# Patient Record
Sex: MALE | Race: WHITE | NOT HISPANIC OR LATINO
[De-identification: names, ages, dates, MRNs, and addresses within clinical notes are randomized per-mention and may not be internally consistent; named-entity substitution may affect disease eponyms.]

---
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
## 2022-07-06 ENCOUNTER — NON-APPOINTMENT (OUTPATIENT)
Age: 22
End: 2022-07-06

## 2023-01-16 ENCOUNTER — NON-APPOINTMENT (OUTPATIENT)
Age: 23
End: 2023-01-16

## 2023-03-28 NOTE — ED PROVIDER NOTE - CARDIAC
INTERVENTION:[id number]}  Weight Bearing Status/Restrictions: 50Yarely Mcgregor [facility] Weight Bearin}  Other Medical Equipment (for information only, NOT a DME order):  {EQUIPMENT:028199135}  Other Treatments: ***    Patient's personal belongings (please select all that are sent with patient):  {CHP DME Belongings:404110754}    RN SIGNATURE:  {Esignature:[id number]}    CASE MANAGEMENT/SOCIAL WORK SECTION    Inpatient Status Date: ***    Readmission Risk Assessment Score:  Readmission Risk              Risk of Unplanned Readmission:  0           Discharging to Facility/ Agency   Name:   Address:  Phone:  Fax:    Dialysis Facility (if applicable)   Name:  Address:  Dialysis Schedule:  Phone:  Fax:    / signature: {Esignature:[id number]}    PHYSICIAN SECTION    Prognosis: {Prognosis:5375909146}    Condition at Discharge: 50Yarely Mcgregor Patient Condition:262848317}    Rehab Potential (if transferring to Rehab): {Prognosis:5865170479}    Recommended Labs or Other Treatments After Discharge: ***    Physician Certification: I certify the above information and transfer of Eulalia Car  is necessary for the continuing treatment of the diagnosis listed and that he requires {Admit to Appropriate Level of Care:28218} for {GREATER/LESS:266910949} 30 days.      Update Admission H&P: {CHP DME Changes in [facility]:996247333}    PHYSICIAN SIGNATURE:  {Esignature:[id number]}
Regular rate and rhythm, Heart sounds S1 S2 present, no murmurs, rubs or gallops

## 2023-09-23 ENCOUNTER — NON-APPOINTMENT (OUTPATIENT)
Age: 23
End: 2023-09-23

## 2024-03-19 ENCOUNTER — NON-APPOINTMENT (OUTPATIENT)
Age: 24
End: 2024-03-19

## 2024-05-19 ENCOUNTER — NON-APPOINTMENT (OUTPATIENT)
Age: 24
End: 2024-05-19

## 2024-05-22 ENCOUNTER — NON-APPOINTMENT (OUTPATIENT)
Age: 24
End: 2024-05-22

## 2024-07-29 NOTE — ED PROVIDER NOTE - PATIENT PORTAL LINK FT
Four or more times a week You can access the FollowMyHealth Patient Portal offered by [facility] by registering at the following website: [email or web address]. By joining [facility]’s FollowMyHealth portal, you will also be able to view your health information using other applications (apps) compatible with our system.

## 2024-07-31 NOTE — ED PROVIDER NOTE - TEMPLATE
Ambulatory Visit  Name: Aman Vasquez      : 1946      MRN: [id number]  Encounter Provider: NADIRA Stover  Encounter Date: 2024   [facility]    Assessment & Plan   1. Thrombocytopenia (HCC)  Assessment & Plan:  - Will repeat CBC.   Orders:  -     CBC and differential; Future  2. Asbestos exposure  Assessment & Plan:  - Hx of asbestos exposure during  service. [facility].  - Will continue to monitor.   3. Anemia, unspecified type  Assessment & Plan:  - CBC and iron panel normal.   - Will continue to monitor.    4. Prostate cancer screening  -     PSA, Total Screen; Future       History of Present Illness     Patient presents to office today for routine follow up. He is not on any prescription medications. He had his blood work done in May which was all stable except for slightly low platelets. Denies any history of thrombocytopenia. Denies any bleeding. He complained of chronic cough last visit due to asbestos exposure in the . [facility]. He has no significant concerns or complaints today.             Review of Systems   Constitutional:  Negative for fatigue and fever.   HENT:  Negative for trouble swallowing.    Eyes:  Negative for visual disturbance.   Respiratory:  Negative for cough and shortness of breath.    Cardiovascular:  Negative for chest pain and palpitations.   Gastrointestinal:  Negative for abdominal pain and blood in stool.   Endocrine: Negative for cold intolerance and heat intolerance.   Genitourinary:  Negative for difficulty urinating and dysuria.   Musculoskeletal:  Negative for gait problem.   Skin:  Negative for rash.   Neurological:  Negative for dizziness, syncope and headaches.   Hematological:  Negative for adenopathy.   Psychiatric/Behavioral:  Negative for behavioral problems.      Past Medical History:   Diagnosis Date   • Hemorrhoids      History  "reviewed. No pertinent surgical history.  Family History   Problem Relation Age of Onset   • Cancer Mother    • Cancer Father    • Cancer Daughter      Social History     Tobacco Use   • Smoking status: Never   • Smokeless tobacco: Never   Vaping Use   • Vaping status: Never Used   Substance and Sexual Activity   • Alcohol use: Yes   • Drug use: Never   • Sexual activity: Not on file     No current outpatient medications on file prior to visit.     No Known Allergies  Immunization History   Administered Date(s) Administered   • COVID-19 PFIZER VACCINE 0.3 ML IM 03/16/2021, 04/15/2021   • COVID-19 Pfizer Vac BIVALENT Dc-sucrose 12 Yr+ IM 03/16/2021, 04/15/2021, 11/02/2021, 08/02/2022   • COVID-19 Pfizer vac (Dc-sucrose, gray cap) 12 yr+ IM 11/02/2021   • INFLUENZA 09/11/2023   • Pneumococcal Conjugate Vaccine 20-valent (Pcv20), Polysace 03/14/2023   • Pneumococcal Polysaccharide PPV23 01/01/2023   • Td (adult), Unspecified 02/26/2019   • Tdap 02/26/2019   • Zoster Vaccine Recombinant 08/19/2022, 03/14/2023     Objective     /80 (BP Location: Left arm, Patient Position: Sitting, Cuff Size: Large)   Temp 98.5 °F (36.9 °C) (Tympanic)   Resp 16   Ht 6' 5\" (1.956 m)   Wt 129 kg (285 lb)   SpO2 97%   BMI 33.80 kg/m²     Physical Exam  Vitals and nursing note reviewed.   Constitutional:       Appearance: Normal appearance.   HENT:      Head: Normocephalic and atraumatic.      Right Ear: External ear normal.      Left Ear: External ear normal.   Eyes:      Conjunctiva/sclera: Conjunctivae normal.   Cardiovascular:      Rate and Rhythm: Normal rate and regular rhythm.      Heart sounds: Normal heart sounds.   Pulmonary:      Effort: Pulmonary effort is normal.      Breath sounds: Normal breath sounds.   Musculoskeletal:         General: Normal range of motion.      Cervical back: Normal range of motion.   Skin:     General: Skin is warm and dry.   Neurological:      Mental Status: He is alert and oriented to " Head Injury person, place, and time.   Psychiatric:         Mood and Affect: Mood normal.         Behavior: Behavior normal.

## 2024-09-30 ENCOUNTER — NON-APPOINTMENT (OUTPATIENT)
Age: 24
End: 2024-09-30

## 2025-05-01 ENCOUNTER — NON-APPOINTMENT (OUTPATIENT)
Age: 25
End: 2025-05-01